# Patient Record
Sex: FEMALE | Race: WHITE | URBAN - METROPOLITAN AREA
[De-identification: names, ages, dates, MRNs, and addresses within clinical notes are randomized per-mention and may not be internally consistent; named-entity substitution may affect disease eponyms.]

---

## 2023-01-12 ENCOUNTER — TELEPHONE ENCOUNTER (OUTPATIENT)
Dept: URBAN - METROPOLITAN AREA CLINIC 72 | Facility: CLINIC | Age: 73
End: 2023-01-12

## 2023-01-13 ENCOUNTER — DASHBOARD ENCOUNTERS (OUTPATIENT)
Age: 73
End: 2023-01-13

## 2023-01-13 ENCOUNTER — WEB ENCOUNTER (OUTPATIENT)
Dept: URBAN - METROPOLITAN AREA CLINIC 72 | Facility: CLINIC | Age: 73
End: 2023-01-13

## 2023-01-13 ENCOUNTER — OFFICE VISIT (OUTPATIENT)
Dept: URBAN - METROPOLITAN AREA CLINIC 72 | Facility: CLINIC | Age: 73
End: 2023-01-13
Payer: COMMERCIAL

## 2023-01-13 ENCOUNTER — LAB OUTSIDE AN ENCOUNTER (OUTPATIENT)
Dept: URBAN - METROPOLITAN AREA CLINIC 72 | Facility: CLINIC | Age: 73
End: 2023-01-13

## 2023-01-13 VITALS
HEIGHT: 66 IN | DIASTOLIC BLOOD PRESSURE: 90 MMHG | WEIGHT: 193 LBS | HEART RATE: 72 BPM | SYSTOLIC BLOOD PRESSURE: 155 MMHG | BODY MASS INDEX: 31.02 KG/M2

## 2023-01-13 DIAGNOSIS — D36.9 ADENOMATOUS POLYP: ICD-10-CM

## 2023-01-13 PROCEDURE — 99204 OFFICE O/P NEW MOD 45 MIN: CPT | Performed by: INTERNAL MEDICINE

## 2023-01-13 RX ORDER — LOSARTAN POTASSIUM 50 MG/1
TABLET, FILM COATED ORAL
Qty: 90 TABLET | Status: ACTIVE | COMMUNITY

## 2023-01-13 RX ORDER — METRONIDAZOLE 500 MG/1
TAKE 1 TABLET AT 3 PM, 4 PM AND 9 PM ON THE DAY BEFORE YOUR SURGERY TABLET ORAL
Qty: 3 EACH | Refills: 0 | Status: ON HOLD | COMMUNITY

## 2023-01-13 RX ORDER — VANCOMYCIN HYDROCHLORIDE 125 MG/1
TAKE 1 CAPSULE BY MOUTH TWICE DAILY FOR 7 DAYS THEN 1 CAPSULE DAILY FOR 7 DAYS THEN 1 CAPSULE EVERY OTHER DAY FOR 4 WEEKS CAPSULE ORAL
Qty: 35 EACH | Refills: 0 | Status: ON HOLD | COMMUNITY

## 2023-01-13 RX ORDER — CIPROFLOXACIN 250 MG/1
TAKE 1 TABLET BY MOUTH EVERY 12 HOURS FOR 10 DAYS TABLET, FILM COATED ORAL
Qty: 20 EACH | Refills: 0 | Status: ON HOLD | COMMUNITY

## 2023-01-13 RX ORDER — APIXABAN 2.5 MG/1
TAKE 1 TABLET BY MOUTH IN THE MORNING AND 1 TABLET BEFORE BEDTIME. DO ALL THIS FOR 30 DAYS TABLET, FILM COATED ORAL
Qty: 60 EACH | Refills: 0 | Status: ON HOLD | COMMUNITY

## 2023-01-13 RX ORDER — NEOMYCIN SULFATE 500 MG/1
TAKE 2 TABLETS BY MOUTH AT 3 PM, 4 PM AND 9 PM ON THE DAY BEFORE YOUR SURGERY TABLET ORAL
Qty: 6 EACH | Refills: 0 | Status: ON HOLD | COMMUNITY

## 2023-01-13 NOTE — HPI-TODAY'S VISIT:
Pleasant 72-year-old female referred by Dr. Quevedo to Dr. Ayers for colonoscopy to remove a large descending colon polyp.   A copy of this note will be sent to the referring provider.  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  Represented to the hospital 4/30/2022 with nausea, vomiting and watery diarrhea.  Stools were positive for C. difficile and Campylobacter.  Prescribed vancomycin was discharged on 5/2/2022.  Colonoscopy 12/8/2022.  40 mm polyp at splenic flexure.  Biopsied.  Exam otherwise normal.  Recommend repeat colonoscopy in 1 month for retreatment.  Descending colon biopsy was tubular adenoma.  Labs 5/20/2022.  CBC: Hemoglobin 10.8, hematocrit 32.9, RBC 3.39.  CMP: Chloride 111, BUN 6, calcium 7.8.  CT abdomen and pelvis with contrast 4/30/2022.  Postsurgical changes of recent low anterior resection.  Small hiatal hernia.  Submucosal edema and mild wall thickening colon may be seen colitis.  Cholelithiasis.  Indeterminate 1.1 cm left renal lesion.  Nonemergent MRI recommended for further evaluation.  On interview today she is here with her caregiver.  No GI complaints currently.  She has needed a 2-day prep in the past.  No problems with anesthesia. No CP or SOB.

## 2023-01-17 ENCOUNTER — TELEPHONE ENCOUNTER (OUTPATIENT)
Dept: URBAN - METROPOLITAN AREA CLINIC 113 | Facility: CLINIC | Age: 73
End: 2023-01-17

## 2023-01-17 ENCOUNTER — LAB OUTSIDE AN ENCOUNTER (OUTPATIENT)
Dept: URBAN - METROPOLITAN AREA CLINIC 113 | Facility: CLINIC | Age: 73
End: 2023-01-17

## 2023-03-20 ENCOUNTER — OFFICE VISIT (OUTPATIENT)
Dept: URBAN - METROPOLITAN AREA MEDICAL CENTER 19 | Facility: MEDICAL CENTER | Age: 73
End: 2023-03-20
Payer: COMMERCIAL

## 2023-03-20 ENCOUNTER — TELEPHONE ENCOUNTER (OUTPATIENT)
Dept: URBAN - METROPOLITAN AREA CLINIC 107 | Facility: CLINIC | Age: 73
End: 2023-03-20

## 2023-03-20 DIAGNOSIS — D12.4 ADENOMA OF DESCENDING COLON: ICD-10-CM

## 2023-03-20 DIAGNOSIS — D12.3 ADENOMA OF TRANSVERSE COLON: ICD-10-CM

## 2023-03-20 PROCEDURE — 45385 COLONOSCOPY W/LESION REMOVAL: CPT | Performed by: INTERNAL MEDICINE

## 2023-03-20 PROCEDURE — 45390 COLONOSCOPY W/RESECTION: CPT | Performed by: INTERNAL MEDICINE

## 2023-03-20 RX ORDER — VANCOMYCIN HYDROCHLORIDE 125 MG/1
TAKE 1 CAPSULE BY MOUTH TWICE DAILY FOR 7 DAYS THEN 1 CAPSULE DAILY FOR 7 DAYS THEN 1 CAPSULE EVERY OTHER DAY FOR 4 WEEKS CAPSULE ORAL
Qty: 35 EACH | Refills: 0 | Status: ON HOLD | COMMUNITY

## 2023-03-20 RX ORDER — CIPROFLOXACIN 250 MG/1
TAKE 1 TABLET BY MOUTH EVERY 12 HOURS FOR 10 DAYS TABLET, FILM COATED ORAL
Qty: 20 EACH | Refills: 0 | Status: ON HOLD | COMMUNITY

## 2023-03-20 RX ORDER — NEOMYCIN SULFATE 500 MG/1
TAKE 2 TABLETS BY MOUTH AT 3 PM, 4 PM AND 9 PM ON THE DAY BEFORE YOUR SURGERY TABLET ORAL
Qty: 6 EACH | Refills: 0 | Status: ON HOLD | COMMUNITY

## 2023-03-20 RX ORDER — METRONIDAZOLE 500 MG/1
TAKE 1 TABLET AT 3 PM, 4 PM AND 9 PM ON THE DAY BEFORE YOUR SURGERY TABLET ORAL
Qty: 3 EACH | Refills: 0 | Status: ON HOLD | COMMUNITY

## 2023-03-20 RX ORDER — LOSARTAN POTASSIUM 50 MG/1
TABLET, FILM COATED ORAL
Qty: 90 TABLET | Status: ACTIVE | COMMUNITY

## 2023-03-20 RX ORDER — APIXABAN 2.5 MG/1
TAKE 1 TABLET BY MOUTH IN THE MORNING AND 1 TABLET BEFORE BEDTIME. DO ALL THIS FOR 30 DAYS TABLET, FILM COATED ORAL
Qty: 60 EACH | Refills: 0 | Status: ON HOLD | COMMUNITY

## 2023-03-22 ENCOUNTER — TELEPHONE ENCOUNTER (OUTPATIENT)
Dept: URBAN - METROPOLITAN AREA CLINIC 35 | Facility: CLINIC | Age: 73
End: 2023-03-22

## 2023-08-22 ENCOUNTER — LAB OUTSIDE AN ENCOUNTER (OUTPATIENT)
Dept: URBAN - METROPOLITAN AREA CLINIC 113 | Facility: CLINIC | Age: 73
End: 2023-08-22

## 2023-10-13 ENCOUNTER — OFFICE VISIT (OUTPATIENT)
Dept: URBAN - METROPOLITAN AREA SURGERY CENTER 25 | Facility: SURGERY CENTER | Age: 73
End: 2023-10-13